# Patient Record
Sex: MALE | URBAN - METROPOLITAN AREA
[De-identification: names, ages, dates, MRNs, and addresses within clinical notes are randomized per-mention and may not be internally consistent; named-entity substitution may affect disease eponyms.]

---

## 2024-02-12 ENCOUNTER — APPOINTMENT (RX ONLY)
Dept: URBAN - METROPOLITAN AREA CLINIC 77 | Facility: CLINIC | Age: 44
Setting detail: DERMATOLOGY
End: 2024-02-12

## 2024-02-12 DIAGNOSIS — D17 BENIGN LIPOMATOUS NEOPLASM: ICD-10-CM

## 2024-02-12 DIAGNOSIS — Z71.89 OTHER SPECIFIED COUNSELING: ICD-10-CM

## 2024-02-12 DIAGNOSIS — L85.3 XEROSIS CUTIS: ICD-10-CM

## 2024-02-12 DIAGNOSIS — D485 NEOPLASM OF UNCERTAIN BEHAVIOR OF SKIN: ICD-10-CM

## 2024-02-12 PROBLEM — D48.5 NEOPLASM OF UNCERTAIN BEHAVIOR OF SKIN: Status: ACTIVE | Noted: 2024-02-12

## 2024-02-12 PROBLEM — D17.30 BENIGN LIPOMATOUS NEOPLASM OF SKIN AND SUBCUTANEOUS TISSUE OF UNSPECIFIED SITES: Status: ACTIVE | Noted: 2024-02-12

## 2024-02-12 PROCEDURE — ? DEFER

## 2024-02-12 PROCEDURE — ? SUNSCREEN RECOMMENDATIONS

## 2024-02-12 PROCEDURE — ? COUNSELING

## 2024-02-12 PROCEDURE — 99203 OFFICE O/P NEW LOW 30 MIN: CPT

## 2024-02-12 NOTE — PROCEDURE: DEFER
Size Of Lesion In Cm (Optional): 0
Detail Level: Zone
Instructions (Optional): Will plan excision at a later date, per patient request.
Introduction Text (Please End With A Colon): The following procedure was deferred;
Procedure To Be Performed At Next Visit: Excision
Other Procedure: ED & C
Instructions (Optional): Willl plan to biopsy a suspicious lesion at the next follow up appointment.
Procedure To Be Performed At Next Visit: Biopsy by shave method

## 2024-03-11 ENCOUNTER — APPOINTMENT (RX ONLY)
Dept: URBAN - METROPOLITAN AREA CLINIC 77 | Facility: CLINIC | Age: 44
Setting detail: DERMATOLOGY
End: 2024-03-11

## 2024-03-11 DIAGNOSIS — Z71.89 OTHER SPECIFIED COUNSELING: ICD-10-CM

## 2024-03-11 DIAGNOSIS — D485 NEOPLASM OF UNCERTAIN BEHAVIOR OF SKIN: ICD-10-CM

## 2024-03-11 PROBLEM — D48.5 NEOPLASM OF UNCERTAIN BEHAVIOR OF SKIN: Status: ACTIVE | Noted: 2024-03-11

## 2024-03-11 PROCEDURE — ? SUNSCREEN RECOMMENDATIONS

## 2024-03-11 PROCEDURE — ? LOCATION MARKER (DETAILED)

## 2024-03-11 PROCEDURE — 99212 OFFICE O/P EST SF 10 MIN: CPT | Mod: 25

## 2024-03-11 PROCEDURE — ? COUNSELING

## 2024-03-11 PROCEDURE — ? DEFER

## 2024-03-11 PROCEDURE — ? TREATMENT REGIMEN

## 2024-03-11 PROCEDURE — ? BIOPSY BY SHAVE METHOD

## 2024-03-11 PROCEDURE — 11102 TANGNTL BX SKIN SINGLE LES: CPT

## 2024-03-11 ASSESSMENT — LOCATION DETAILED DESCRIPTION DERM
LOCATION DETAILED: RIGHT LATERAL MALAR CHEEK
LOCATION DETAILED: RIGHT ELBOW

## 2024-03-11 ASSESSMENT — LOCATION SIMPLE DESCRIPTION DERM
LOCATION SIMPLE: RIGHT ELBOW
LOCATION SIMPLE: RIGHT CHEEK

## 2024-03-11 ASSESSMENT — LOCATION ZONE DERM
LOCATION ZONE: FACE
LOCATION ZONE: ARM

## 2024-03-11 NOTE — PROCEDURE: BIOPSY BY SHAVE METHOD
Body Location Override (Optional - Billing Will Still Be Based On Selected Body Map Location If Applicable): right pre auricular
Detail Level: Detailed
Depth Of Biopsy: dermis
Was A Bandage Applied: Yes
Size Of Lesion In Cm: 0
X Size Of Lesion In Cm: 0.6
Biopsy Type: H and E
Biopsy Method: Dermablade
Anesthesia Type: 1% lidocaine without epinephrine
Anesthesia Volume In Cc: 0.5
Hemostasis: Drysol
Wound Care: Petrolatum
Dressing: bandage
Destruction After The Procedure: No
Type Of Destruction Used: Curettage
Curettage Text: The wound bed was treated with curettage after the biopsy was performed.
Cryotherapy Text: The wound bed was treated with cryotherapy after the biopsy was performed.
Electrodesiccation Text: The wound bed was treated with electrodesiccation after the biopsy was performed.
Electrodesiccation And Curettage Text: The wound bed was treated with electrodesiccation and curettage after the biopsy was performed.
Silver Nitrate Text: The wound bed was treated with silver nitrate after the biopsy was performed.
Lab: 48
Path Notes (To The Dermatopathologist): R/O: DN vs. ISK \\nSize: 0.6cm
Consent: Written consent was obtained and risks were reviewed including but not limited to scarring, infection, bleeding, scabbing, incomplete removal, nerve damage and allergy to anesthesia.
Post-Care Instructions: I reviewed with the patient in detail post-care instructions. Patient is to keep the biopsy site dry overnight, and then apply bacitracin twice daily until healed. Patient may apply hydrogen peroxide soaks to remove any crusting.
Notification Instructions: Patient will be notified of biopsy results. However, patient instructed to call the office if not contacted within 2 weeks.
Billing Type: Third-Party Bill
Information: Selecting Yes will display possible errors in your note based on the variables you have selected. This validation is only offered as a suggestion for you. PLEASE NOTE THAT THE VALIDATION TEXT WILL BE REMOVED WHEN YOU FINALIZE YOUR NOTE. IF YOU WANT TO FAX A PRELIMINARY NOTE YOU WILL NEED TO TOGGLE THIS TO 'NO' IF YOU DO NOT WANT IT IN YOUR FAXED NOTE.

## 2024-03-11 NOTE — PROCEDURE: DEFER
Size Of Lesion In Cm (Optional): 0
Procedure To Be Performed At Next Visit: Excision
Introduction Text (Please End With A Colon): The following procedure was deferred;
Detail Level: Zone
Other Procedure: ED & C
Instructions (Optional): Will plan excision at the next available appointment.

## 2024-04-15 ENCOUNTER — APPOINTMENT (RX ONLY)
Dept: URBAN - METROPOLITAN AREA CLINIC 77 | Facility: CLINIC | Age: 44
Setting detail: DERMATOLOGY
End: 2024-04-15

## 2024-04-15 DIAGNOSIS — Z71.89 OTHER SPECIFIED COUNSELING: ICD-10-CM

## 2024-04-15 DIAGNOSIS — D492 NEOPLASM OF UNSPECIFIED NATURE OF BONE, SOFT TISSUE, AND SKIN: ICD-10-CM

## 2024-04-15 DIAGNOSIS — L82.1 OTHER SEBORRHEIC KERATOSIS: ICD-10-CM

## 2024-04-15 PROBLEM — R22.9 LOCALIZED SWELLING, MASS AND LUMP, UNSPECIFIED: Status: ACTIVE | Noted: 2024-04-15

## 2024-04-15 PROCEDURE — 99213 OFFICE O/P EST LOW 20 MIN: CPT

## 2024-04-15 PROCEDURE — ? SUNSCREEN RECOMMENDATIONS

## 2024-04-15 PROCEDURE — ? PATHOLOGY DISCUSSION

## 2024-04-15 PROCEDURE — ? COUNSELING

## 2024-04-15 PROCEDURE — ? ORDER ULTRASOUND

## 2025-01-13 ENCOUNTER — HOSPITAL ENCOUNTER (EMERGENCY)
Age: 45
Discharge: HOME | End: 2025-01-13
Payer: COMMERCIAL

## 2025-01-13 VITALS
OXYGEN SATURATION: 97 % | DIASTOLIC BLOOD PRESSURE: 82 MMHG | HEART RATE: 87 BPM | RESPIRATION RATE: 18 BRPM | SYSTOLIC BLOOD PRESSURE: 123 MMHG | TEMPERATURE: 98.3 F
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
## 2025-01-13 DIAGNOSIS — S39.012A: Primary | ICD-10-CM

## 2025-01-13 PROCEDURE — 96372 THER/PROPH/DIAG INJ SC/IM: CPT

## 2025-01-13 PROCEDURE — 72100 X-RAY EXAM L-S SPINE 2/3 VWS: CPT

## 2025-01-13 PROCEDURE — 99283 EMERGENCY DEPT VISIT LOW MDM: CPT

## 2025-01-13 NOTE — XR_ITS
Examination: Lumbar spine 3 views 
  
Technique one AP lateral coned lateral lower lumbar spine 3 views 
  
Exam date and time: January 13, 2025 1256 hours 
  
INDICATIONS: Lower back pain today. 
  
FINDINGS: 
  
Adequate alignment lumbar vertebral bodies 
Mild to moderate disc narrowing L5-S1 
No lumbar fracture 
  
IMPRESSION: 
  
No lumbar fracture 
  
Mild to moderate disc narrowing L5-S1

## 2025-01-13 NOTE — EDNOTE_ITS
ED Back Injury Pain RME/HPI    
General    
Chief Complaint: Back Pain/Injury    
Stated Complaint: BACK INJURY AT WORK X AM    
Time Seen by Provider: 01/13/25 12:32    
Arrival date/time:     
01/13/25 12:18    
44-year-old male presents emergency department today complains of back pain   
while at work today patient reports he was lifting a trash can develop pain in   
the lower back there are no other associated symptoms or aggravating factors no   
other modifying factors, patient denies taking medication before coming to ER   
today    
Limitations: no limitations    
Related Data    
                                  Previous Rx's    
    
    
    
?Medication ?Instructions ?Recorded    
     
cyclobenzaprine 10 mg tablet 10 mg PO TID PRN muscle spasm 10 01/13/25    
    
 days #30 tab-caps     
     
ibuprofen 800 mg tablet 800 mg PO TID PRN pain #30 tabs 01/13/25    
    
    
    
                                    Allergies    
    
    
    
Allergy/AdvReac Type Severity Reaction Status Date / Time    
     
NKA* Allergy   Uncoded 01/13/25 12:19    
    
    
    
    
Review of Systems    
Review of Systems    
Systems Reviewed: All systems reviewed, normal except as documented    
Constitutional    
Constitutional: Reports system reviewed and no additional complaints, except as   
documented, Denies fever(s) and Denies headache(s)    
Eyes    
Eyes: Reports system reviewed and no additional complaints, except as documented  
and Denies blurry vision    
ENT    
Ears, Nose, Mouth, and Throat: Reports system reviewed and no additional   
complaints, except as documented, Denies headache(s), Denies nasal congestion   
and Denies nasal discharge    
Cardiovascular    
Cardiovascular: Reports system reviewed and no additional complaints, except as   
documented, Denies chest pain and Denies dyspnea    
Respiratory    
Respiratory: Reports system reviewed and no additional complaints, except as   
documented, Denies chest congestion, Denies cough and Denies dyspnea    
Gastrointestinal    
Gastrointestinal: Reports system reviewed and no additional complaints, except   
as documented and Denies abdominal pain    
Musculoskeletal    
Musculoskeletal: Reports system reviewed and no additional complaints, except as  
documented and Reports back pain    
Integumentary/Breasts    
Skin/Breast: Reports system reviewed and no additional complaints, except as   
documented and Denies rash    
Neurologic    
Neurologic: Reports system reviewed and no additional complaints, except as   
documented, Reports as per HPI and Denies headache(s)    
    
Past Medical History    
Past Medical History    
CARDIAC: Negative Congestive Heart Failure or Hypertension    
RESPIRATORY: Negative Chronic Obstructive Pulmonary Disease (COPD)    
GENITOURINARY: Negative Renal Disease    
ENT: Negative Glaucoma    
ENDOCRINE: Negative Diabetes Mellitus Type 1 or Diabetes Mellitus Type 2    
HEMATOLOGIC: Negative Blood Disorders    
Social History    
SMOKING STATUS: Never smoker    
    
ED Exam    
General    
Limitations: Present no limitations    
General appearance: Present alert and in no apparent distress    
Head    
Head exam: Present atraumatic, normocephalic and normal inspection    
Eye    
Eye exam: Present normal appearance, PERRL and EOMI    
ENT    
ENT exam: Present normal exam, normal oropharynx and mucous membranes moist    
Neck    
Neck exam: Present normal inspection, full ROM and trachea midline    
Chest    
Chest inspection: Present normal inspection and symmetric chest wall rise    
Respiratory    
Respiratory exam: Present normal lung sounds bilaterally    
Cardiovascular    
Cardiovascular exam: Present regular rate, normal rhythm and normal heart sounds    
Abdominal Exam    
Abdominal exam: Present soft and normal bowel sounds; Absent distention or   
tenderness    
Extremities Exam    
Extremities exam: Present normal inspection and full ROM    
Back Exam    
Back exam: Present normal inspection, full ROM, tenderness and muscle spasm;   
Absent CVA tenderness (R) or CVA tenderness (L)    
Neurological Exam    
Neurological exam: Present alert, oriented X3 and CN II-XII intact    
Psychiatric    
Psychiatric exam: Present normal affect and normal mood    
Skin    
Skin exam: Present warm, dry, intact and normal color    
    
Course    
Quality Measures    
none    
Orders    
    
                                            
    
    
    
 Category Date Time Status    
     
 XR lumbar spine 2-3V Stat Exams  01/13/25 12:33 Completed    
     
 Ketorolac Inj [Toradol Inj] Med  01/13/25 12:33 Discontinued    
    
 30 mg IM X1 ONE       
    
    
    
    
Vital Signs    
Vital signs:     
    
                                   Vital Signs    
    
    
    
Temperature  98.3 F   01/13/25 12:27    
     
Pulse Rate  87   01/13/25 12:27    
     
Respiratory Rate  18   01/13/25 12:27    
     
Blood Pressure  123/82   01/13/25 12:27    
     
Pulse Oximetry (%)  97   01/13/25 12:27    
     
Oxygen Delivery Method  Room Air   01/13/25 12:27    
    
    
    
O2 saturation 97% on room air within normal limits    
    
Back Pain / Injury    
MDM Narrative    
MDM Narrative::     
44-year-old male presents emergency department today complains of back pain   
while at work today patient reports he was lifting a trash can develop pain in   
the lower back patient reports no saddle anesthesia no loss of bowel or bladder   
patient walks with steady gait there are no other associated symptoms or   
aggravating factors no other modifying factors, patient denies taking medication  
before coming to ER today    
On exam patient has tenderness of the lower back patient reports pain is worse   
with movement    
Imaging obtained no acute emergent findings noted    
Workmen's Compensation papers completed patient instructed to follow-up with   
Worker's Compensation    
For emergent concerns patient instructed return immediately    
Patient data    
External records reviewed:: [facility] previous records    
Clinical information provided by:: patient    
Social determinants that could affect healthcare access:: none    
Patient has the following chronic illnesses::     
Chronic back pain    
How is presenting disease/condition affected by chronic disease/condition?:   
exacerbated by    
Evaluation data    
The following diagnostics were reviewed and interpreted by me:: radiology e  
xam(s)    
Lab and/or radiology exams considered but not ordered::     
Radiology obtain    
Interpretation Summary:     
Reviewed by me    
Medications / Prescriptions    
Medications or Prescriptions considered but not ordered::     
Given    
Medication administrations::     
    
                        Medication Administration History    
    
    
Discontinued Medications    
    
Ketorolac Tromethamine (Ketorolac Inj 30 Mg/Ml Vial)  30 mg IM X1 ONE    
   Stop: 01/13/25 12:34    
   Last Admin: 01/13/25 13:04  Dose: 30 mg    
   Documented By: MP    
    
    
Given    
Consultations    
Consultation(s) initiated? (list below): No    
Diagnosis    
Differential diagnosis back pain/injury: lumbar radiculopathy, sciatica and   
strain of lumbar region    
Most likely diagnosis given after review of the tests above::     
Back pain    
Admission Indicated    
Admission indicated?: not indicated    
Admission Request    
Was there a request for admission?: No    
Disposition Plan    
Disposition Plan: Discharge    
Discharge Attestation    
Discharge Attestation:     
The patient and all family members were given an opportunity to ask questions   
and understood the discharge instructions.  Discharge instructions specifically   
effects, indications for sooner follow up or return to the emergency department,  
and the expected course of current diagnosis.  Patient condition:  Stable    
    
Discharge Plan    
Plan    
Patient Disposition: HOME (Self Care)    
    
Disposition Comment: Stable    
    
Prescriptions/Referrals    
Prescriptions/Med Rec:    
New    
  ibuprofen 800 mg tablet     
   800 mg PO TID PRN (Reason: pain) Qty: 30 0RF    
  cyclobenzaprine 10 mg tablet     
   10 mg PO TID PRN (Reason: muscle spasm) 10 Days Qty: 30 0RF    
    
Referrals:    
Suzanne Canas NP [Primary Care Provider] - 01/14/25    
    
Problem List    
Clinical Impression:    
 Strain of lumbar region, Work related injury    
    
    
Patient/Caregiver Discharge Instructions    
Education Materials:  ED Back Sprain/Strain    
    
Additional Instructions:    
Please follow up with your primary care doctor in the next 24-48hrs for any   
worsening symptoms return here immediately     
    
Print Language: English    
    
Stand Alone Forms:  [employer] Info., Patient Portal Info Letter    
    
PA/JOSE Supervising Physician    
PA/JOSE Supervising Physician: dr posada

## 2025-01-13 NOTE — PD.EDBACK
ED Back Injury Pain RME/HPI
General
Chief Complaint: Back Pain/Injury
Stated Complaint: BACK INJURY AT WORK X AM
Time Seen by Provider: 01/13/25 12:32
Arrival date/time: 
01/13/25 12:18
44-year-old male presents emergency department today complains of back pain while at work today patient reports he was lifting a trash can develop pain in the lower back there are no other associated symptoms or aggravating factors no other 
modifying factors, patient denies taking medication before coming to ER today
Limitations: no limitations
Related Data
Previous Rx's

?Medication ?Instructions ?Recorded
cyclobenzaprine 10 mg tablet 10 mg PO TID PRN muscle spasm 10 01/13/25
 days #30 tab-caps 
ibuprofen 800 mg tablet 800 mg PO TID PRN pain #30 tabs 01/13/25


Allergies

Allergy/AdvReac Type Severity Reaction Status Date / Time
NKA* Allergy   Uncoded 01/13/25 12:19



Review of Systems
Review of Systems
Systems Reviewed: All systems reviewed, normal except as documented
Constitutional
Constitutional: Reports system reviewed and no additional complaints, except as documented, Denies fever(s) and Denies headache(s)
Eyes
Eyes: Reports system reviewed and no additional complaints, except as documented and Denies blurry vision
ENT
Ears, Nose, Mouth, and Throat: Reports system reviewed and no additional complaints, except as documented, Denies headache(s), Denies nasal congestion and Denies nasal discharge
Cardiovascular
Cardiovascular: Reports system reviewed and no additional complaints, except as documented, Denies chest pain and Denies dyspnea
Respiratory
Respiratory: Reports system reviewed and no additional complaints, except as documented, Denies chest congestion, Denies cough and Denies dyspnea
Gastrointestinal
Gastrointestinal: Reports system reviewed and no additional complaints, except as documented and Denies abdominal pain
Musculoskeletal
Musculoskeletal: Reports system reviewed and no additional complaints, except as documented and Reports back pain
Integumentary/Breasts
Skin/Breast: Reports system reviewed and no additional complaints, except as documented and Denies rash
Neurologic
Neurologic: Reports system reviewed and no additional complaints, except as documented, Reports as per HPI and Denies headache(s)

Past Medical History
Past Medical History
CARDIAC: Negative Congestive Heart Failure or Hypertension
RESPIRATORY: Negative Chronic Obstructive Pulmonary Disease (COPD)
GENITOURINARY: Negative Renal Disease
ENT: Negative Glaucoma
ENDOCRINE: Negative Diabetes Mellitus Type 1 or Diabetes Mellitus Type 2
HEMATOLOGIC: Negative Blood Disorders
Social History
SMOKING STATUS: Never smoker

ED Exam
General
Limitations: Present no limitations
General appearance: Present alert and in no apparent distress
Head
Head exam: Present atraumatic, normocephalic and normal inspection
Eye
Eye exam: Present normal appearance, PERRL and EOMI
ENT
ENT exam: Present normal exam, normal oropharynx and mucous membranes moist
Neck
Neck exam: Present normal inspection, full ROM and trachea midline
Chest
Chest inspection: Present normal inspection and symmetric chest wall rise
Respiratory
Respiratory exam: Present normal lung sounds bilaterally
Cardiovascular
Cardiovascular exam: Present regular rate, normal rhythm and normal heart sounds
Abdominal Exam
Abdominal exam: Present soft and normal bowel sounds; Absent distention or tenderness
Extremities Exam
Extremities exam: Present normal inspection and full ROM
Back Exam
Back exam: Present normal inspection, full ROM, tenderness and muscle spasm; Absent CVA tenderness (R) or CVA tenderness (L)
Neurological Exam
Neurological exam: Present alert, oriented X3 and CN II-XII intact
Psychiatric
Psychiatric exam: Present normal affect and normal mood
Skin
Skin exam: Present warm, dry, intact and normal color

Course
Quality Measures
none
Orders



 Category Date Time Status
 XR lumbar spine 2-3V Stat Exams  01/13/25 12:33 Completed
 Ketorolac Inj [Toradol Inj] Med  01/13/25 12:33 Discontinued
 30 mg IM X1 ONE   



Vital Signs
Vital signs: 

Vital Signs

Temperature  98.3 F   01/13/25 12:27
Pulse Rate  87   01/13/25 12:27
Respiratory Rate  18   01/13/25 12:27
Blood Pressure  123/82   01/13/25 12:27
Pulse Oximetry (%)  97   01/13/25 12:27
Oxygen Delivery Method  Room Air   01/13/25 12:27


O2 saturation 97% on room air within normal limits

Back Pain / Injury
MDM Narrative
MDM Narrative:: 
44-year-old male presents emergency department today complains of back pain while at work today patient reports he was lifting a trash can develop pain in the lower back patient reports no saddle anesthesia no loss of bowel or bladder patient walks 
with steady gait there are no other associated symptoms or aggravating factors no other modifying factors, patient denies taking medication before coming to ER today
On exam patient has tenderness of the lower back patient reports pain is worse with movement
Imaging obtained no acute emergent findings noted
Workmen's Compensation papers completed patient instructed to follow-up with Worker's Compensation
For emergent concerns patient instructed return immediately
Patient data
External records reviewed:: [facility] previous records
Clinical information provided by:: patient
Social determinants that could affect healthcare access:: none
Patient has the following chronic illnesses:: 
Chronic back pain
How is presenting disease/condition affected by chronic disease/condition?: exacerbated by
Evaluation data
The following diagnostics were reviewed and interpreted by me:: radiology exam(s)
Lab and/or radiology exams considered but not ordered:: 
Radiology obtain
Interpretation Summary: 
Reviewed by me
Medications / Prescriptions
Medications or Prescriptions considered but not ordered:: 
Given
Medication administrations:: 

Medication Administration History


Discontinued Medications

Ketorolac Tromethamine (Ketorolac Inj 30 Mg/Ml Vial)  30 mg IM X1 ONE
 Stop: 01/13/25 12:34
 Last Admin: 01/13/25 13:04  Dose: 30 mg
 Documented By: MP


Given
Consultations
Consultation(s) initiated? (list below): No
Diagnosis
Differential diagnosis back pain/injury: lumbar radiculopathy, sciatica and strain of lumbar region
Most likely diagnosis given after review of the tests above:: 
Back pain
Admission Indicated
Admission indicated?: not indicated
Admission Request
Was there a request for admission?: No
Disposition Plan
Disposition Plan: Discharge
Discharge Attestation
Discharge Attestation: 
The patient and all family members were given an opportunity to ask questions and understood the discharge instructions.  Discharge instructions specifically effects, indications for sooner follow up or return to the emergency department, and the 
expected course of current diagnosis.  Patient condition:  Stable

Discharge Plan
Plan
Patient Disposition: HOME (Self Care)

Disposition Comment: Stable

Prescriptions/Referrals
Prescriptions/Med Rec:
New
  ibuprofen 800 mg tablet 
   800 mg PO TID PRN (Reason: pain) Qty: 30 0RF
  cyclobenzaprine 10 mg tablet 
   10 mg PO TID PRN (Reason: muscle spasm) 10 Days Qty: 30 0RF

Referrals:
Suzanne Canas NP [Primary Care Provider] - 01/14/25

Problem List
Clinical Impression:
 Strain of lumbar region, Work related injury


Patient/Caregiver Discharge Instructions
Education Materials:  ED Back Sprain/Strain

Additional Instructions:
Please follow up with your primary care doctor in the next 24-48hrs for any worsening symptoms return here immediately 

Print Language: English

Stand Alone Forms:  [employer] Info., Patient Portal Info Letter

PA/JOSE Supervising Physician
PA/JOSE Supervising Physician: dr posada